# Patient Record
Sex: FEMALE | Race: WHITE | NOT HISPANIC OR LATINO | Employment: OTHER | URBAN - METROPOLITAN AREA
[De-identification: names, ages, dates, MRNs, and addresses within clinical notes are randomized per-mention and may not be internally consistent; named-entity substitution may affect disease eponyms.]

---

## 2020-07-21 ENCOUNTER — OFFICE VISIT (OUTPATIENT)
Dept: NEUROLOGY | Facility: CLINIC | Age: 85
End: 2020-07-21
Payer: MEDICARE

## 2020-07-21 DIAGNOSIS — G31.84 MILD NEUROCOGNITIVE DISORDER: ICD-10-CM

## 2020-07-21 PROBLEM — G47.33 OBSTRUCTIVE SLEEP APNEA SYNDROME: Status: ACTIVE | Noted: 2020-07-21

## 2020-07-21 PROBLEM — M81.0 OSTEOPOROSIS: Status: ACTIVE | Noted: 2020-07-21

## 2020-07-21 PROBLEM — J30.9 ALLERGIC RHINITIS: Status: ACTIVE | Noted: 2020-07-21

## 2020-07-21 PROBLEM — E78.5 HYPERLIPIDEMIA: Status: ACTIVE | Noted: 2020-07-21

## 2020-07-21 PROBLEM — I10 HYPERTENSION: Status: ACTIVE | Noted: 2020-07-21

## 2020-07-21 PROBLEM — E66.9 ADULT-ONSET OBESITY: Status: ACTIVE | Noted: 2020-07-21

## 2020-07-21 PROCEDURE — 96116 PR NEUROBEHAVIORAL STATUS EXAM BY PSYCH/PHYS: ICD-10-PCS | Mod: 95,,, | Performed by: CLINICAL NEUROPSYCHOLOGIST

## 2020-07-21 PROCEDURE — 99499 NO LOS: ICD-10-PCS | Mod: 95,,, | Performed by: CLINICAL NEUROPSYCHOLOGIST

## 2020-07-21 PROCEDURE — 96116 NUBHVL XM PHYS/QHP 1ST HR: CPT | Mod: 95,,, | Performed by: CLINICAL NEUROPSYCHOLOGIST

## 2020-07-21 PROCEDURE — 99499 UNLISTED E&M SERVICE: CPT | Mod: 95,,, | Performed by: CLINICAL NEUROPSYCHOLOGIST

## 2020-07-21 NOTE — Clinical Note
>>Memory Assessment Clinic: Schedule for in-person assessment. She lives near [place], so will take some planning. Also, recommend they get pre-visit labs and pre-visit imaging if possible.   Neuropsych: Testing  Medical/MD/NP: Routine neuro eval  Care Management: Unclear about care needs presently but has good family support

## 2020-07-21 NOTE — PROGRESS NOTES
NEUROPSYCHOLOGY CONSULT (TELEHEALTH)    Referral Information  Name: Chelsi De Los Santos  MRN: [id number]  : 1935  Age: 84 y.o.  Race: White  Gender: female  Referring Provider: Moe Martínez MD  Billing: See below for details as coding/billing has changed   Telemedicine:   The patient location is: Home  The provider location is: [facility]  The chief complaint leading to consultation/medical necessity is: Memory Loss with concern for dementia  Visit type:  Virtual visit with audio only (telephone)    The reason for the audio only service rather than synchronous audio and video virtual visit was related to patient preference.    Total time spent with patient: 60-minutes  Each patient to whom he or she provides medical services by telemedicine is:  (1) informed of the relationship between the physician and patient and the respective role of any other health care provider with respect to management of the patient; and (2) notified that he or she may decline to receive medical services by telemedicine and may withdraw from such care at any time.  Consent/Emergency Plan: The patient expressed an understanding of the purpose of the evaluation and consented to all procedures. I informed the patient of limits to confidentiality and discussed an emergency plan.      SUMMARY/TREATMENT PLAN   Results from the interview indicate the following diagnoses and treatment plan recommendations. This was discussed with patient and sister today    Diagnoses/Plan:  Problem List Items Addressed This Visit        Neuro    Mild neurocognitive disorder    Current Assessment & Plan     Assessment:  -Mild but worsening short-term memory trouble in the past year  -Etiology/Diagnosis:   --primary rule outs include: normal aging, Alzheimer's, vascular etiology, along with reversible causes  -Reversible Risk Factors: No depression, sleep trouble  Plan:  >>[facility] Assessment Clinic: Schedule for in-person assessment. She lives near [place], so will  "take some planning. Also, recommend they get pre-visit labs and pre-visit imaging if possible.   1. Neuropsych: Testing  2. Medical/MD/NP: Routine neuro eval  3. Care Management: Unclear about care needs presently but has good family support                   HISTORY OF PRESENT ILLNESS AND CURRENT SYMPTOMS     Ms. De Los Santos has active problems noted below. She was joined via phone initial consult by her sister.    Cognitive Symptoms:   Onset/Course:   o Per Pt: Some memory trouble but reported "not a big deal." She reported that her family seems more concerned.  o Per Sister: She reported gradual onset in the past year ( "Gradual thing" of memory loss). Course has been mildly worsening over the past year. Sister added "Mainly short-term memory" but "long term is great." Sister denied any other cognitive sxs apart from memory trouble.    Current Functional Status/Needs:  ADLs  Self-Care Eating Safety Other   Independent Independent NA NA     Instrumental IADLs:   Driving Medications/Health Household Finances   -Independent -Sister agreed she takes most days but does forget more now than in the past    -Independent -Sister took over finances 2-years ago due to patient forgetting to pay bills  -This seems the main functional issue at present     Psychiatric/Behavioral Symptoms:  Mood:  Depression/Dysphoria Anxiety/Fearfulness Irritability   -None -None -None     Behavior:  Agitation/Resistance Delusions/Paranoia Hallucinations   -None -None -None     Apathy/Motivation Repetitive/Restlessness Other   -None -None -None     Neurovegetative:  Sleep/Nighttime  Appetite Energy   -No major issues -Normal -Normal     Suicidal/Homicidal Ideation: None     Physical Symptoms: None    PERTINENT BACKGROUND INFORMATION   SOCIAL HISTORY    · Family Status:  11 years ago + Five adult children (daughter and son lives nearby)  · Current Living Situation: Own home  · Primary Source of Support: Adult children and sister  · Daily " "Activities: Still engaged and no major changes  · Stressors: None  · Other Factors:  · Educational Level: HS and attended business school for one year ( school)  · 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   · Other: None    Family Neurologic History: Negative for heritable risk factors  Family Psychiatric History: Negative for heritable risk factors    MEDICAL STATUS  Patient Active Problem List   Diagnosis    Hypertension    Hyperlipidemia    Allergic rhinitis    Osteoporosis    Obstructive sleep apnea syndrome    Adult-onset obesity    Mild neurocognitive disorder     Primary Care Provider: "Used to be Dr. Martínez, but he retired." She sees an NP ("Emily") and Dr. Griffin.     Active problems reported as "nothing beside a little lack of memory."     No past medical history on file.  No past surgical history on file.    Updated/Relevant Neurologic History:  · Falls: None  · TBI: None  · Seizures: None  · Stroke: None  · Movement Concerns: None  · Referral Diagnosis:    Recent Labs and Imaging  No results found for: BIMDMQDA17  No results found for: RPR  No results found for: FOLATE  No results found for: TSH, X5WGGCA, N6YSPJF, THYROIDAB  No results found for: LABA1C, HGBA1C  No results found for: HIV1X2, TQY57RNSN    Current Medications  None in file, but review Care Everywhere     Updated/Relevant Psychiatric History: None    MENTAL STATUS AND OBSERVATIONS:  APPEARANCE: Casually dressed and adequate grooming/hygiene.   ALERTNESS/ORIENTATION: Attentive and alert. Year: 2020, Month: June, Day: Tues, Date: ND, Season: Summer; POTUS: Trump  GAIT: Not assessed  MOTOR MOVEMENTS/MANNERISMS: Not assessed  SPEECH/LANGUAGE: Normal in rate, rhythm, tone, and volume. No significant word finding difficulty noted. Expressive and receptive language was normal.  STATED MOOD/AFFECT: The " "patients stated mood was "good." Affect was congruent with stated mood.   INTERPERSONAL BEHAVIOR: Rapport was quickly and easily established   SUICIDALITY/HOMICIDALITY: Denied  HALLUCINATIONS/DELUSIONS: None evidenced or endorsed  THOUGHT PROCESSES/INSIGHT: Thoughts seemed logical and goal-directed.     QUESTIONNAIRE RESPONSES  Questionnaires were completed via telephone with a psychometrist and reviewed and incorporated by the neuropsychologist.    BILLING  Service Description CPT Code Minutes Units   Psychiatric diagnostic evaluation by physician [id number]     Neurobehavioral status exam by physician [id number] 45 1   Each additional hour by physician [id number]  0   Test Evaluation Services --  --   Neuropsychological testing evaluation services by physician [id number]  0   Each additional hour by physician [id number]  0   Test Administration and Scoring --  --   Psychological or neuropsychological test administration and scoring by physician [id number]  0   Each additional 30 minutes by physician [id number]  0   Psychological or neuropsychological test administration and scoring by technician [id number]  0   Each additional 30 minutes by technician [id number]  0                   "

## 2020-07-31 PROBLEM — G31.84 MILD NEUROCOGNITIVE DISORDER: Status: ACTIVE | Noted: 2020-07-31

## 2020-07-31 NOTE — ASSESSMENT & PLAN NOTE
Assessment:  -Mild but worsening short-term memory trouble in the past year  -Etiology/Diagnosis:   --primary rule outs include: normal aging, Alzheimer's, vascular etiology, along with reversible labs (not apparently ordered but few records right now)  -Reversible Risk Factors: No depression, sleep trouble  Plan:  >>Memory Assessment Clinic: Schedule for in-person assessment. She lives near [place], so will take some planning. Also, recommend they get pre-visit labs and pre-visit imaging if possible.   1. Neuropsych: Testing  2. Medical/MD/NP: Routine neuro eval  3. Care Management: Unclear about care needs presently but has good family support

## 2020-08-25 ENCOUNTER — TELEPHONE (OUTPATIENT)
Dept: NEUROLOGY | Facility: CLINIC | Age: 85
End: 2020-08-25

## 2020-08-26 ENCOUNTER — INITIAL CONSULT (OUTPATIENT)
Dept: NEUROLOGY | Facility: CLINIC | Age: 85
End: 2020-08-26
Payer: MEDICARE

## 2020-08-26 ENCOUNTER — TELEPHONE (OUTPATIENT)
Dept: NEUROLOGY | Facility: CLINIC | Age: 85
End: 2020-08-26

## 2020-08-26 DIAGNOSIS — G30.9 MAJOR NEUROCOGNITIVE DISORDER DUE TO ALZHEIMER'S DISEASE: ICD-10-CM

## 2020-08-26 DIAGNOSIS — F02.80 MAJOR NEUROCOGNITIVE DISORDER DUE TO ALZHEIMER'S DISEASE: ICD-10-CM

## 2020-08-26 PROCEDURE — 99999 PR PBB SHADOW E&M-EST. PATIENT-LVL I: ICD-10-PCS | Mod: PBBFAC,,,

## 2020-08-26 PROCEDURE — 96138 PR PSYCH/NEUROPSYCH TEST ADMIN/SCORING, BY TECH, 2+ TESTS, 1ST 30 MIN: ICD-10-PCS | Mod: ,,, | Performed by: CLINICAL NEUROPSYCHOLOGIST

## 2020-08-26 PROCEDURE — 96116 PR NEUROBEHAVIORAL STATUS EXAM BY PSYCH/PHYS: ICD-10-PCS | Mod: S$PBB,,, | Performed by: CLINICAL NEUROPSYCHOLOGIST

## 2020-08-26 PROCEDURE — 99499 UNLISTED E&M SERVICE: CPT | Mod: S$PBB,,, | Performed by: PSYCHIATRY & NEUROLOGY

## 2020-08-26 PROCEDURE — 96132 NRPSYC TST EVAL PHYS/QHP 1ST: CPT | Mod: ,,, | Performed by: CLINICAL NEUROPSYCHOLOGIST

## 2020-08-26 PROCEDURE — 96116 NUBHVL XM PHYS/QHP 1ST HR: CPT | Mod: S$PBB,,, | Performed by: CLINICAL NEUROPSYCHOLOGIST

## 2020-08-26 PROCEDURE — 96139 PR PSYCH/NEUROPSYCH TEST ADMIN/SCORING, BY TECH, 2+ TESTS, EA ADDTL 30 MIN: ICD-10-PCS | Mod: ,,, | Performed by: CLINICAL NEUROPSYCHOLOGIST

## 2020-08-26 PROCEDURE — 99499 NO LOS: ICD-10-PCS | Mod: S$PBB,,, | Performed by: PSYCHIATRY & NEUROLOGY

## 2020-08-26 PROCEDURE — 96139 PSYCL/NRPSYC TST TECH EA: CPT | Mod: ,,, | Performed by: CLINICAL NEUROPSYCHOLOGIST

## 2020-08-26 PROCEDURE — 96133 NRPSYC TST EVAL PHYS/QHP EA: CPT | Mod: ,,, | Performed by: CLINICAL NEUROPSYCHOLOGIST

## 2020-08-26 PROCEDURE — 99211 OFF/OP EST MAY X REQ PHY/QHP: CPT | Mod: PBBFAC,25

## 2020-08-26 PROCEDURE — 96133 PR NEUROPSYCHOLOGIC TEST EVAL SVCS, EA ADDTL HR: ICD-10-PCS | Mod: ,,, | Performed by: CLINICAL NEUROPSYCHOLOGIST

## 2020-08-26 PROCEDURE — 99205 PR OFFICE/OUTPT VISIT, NEW, LEVL V, 60-74 MIN: ICD-10-PCS | Mod: S$PBB,,, | Performed by: NURSE PRACTITIONER

## 2020-08-26 PROCEDURE — 96116 NUBHVL XM PHYS/QHP 1ST HR: CPT | Mod: PBBFAC | Performed by: CLINICAL NEUROPSYCHOLOGIST

## 2020-08-26 PROCEDURE — 96132 PR NEUROPSYCHOLOGIC TEST EVAL SVCS, 1ST HR: ICD-10-PCS | Mod: ,,, | Performed by: CLINICAL NEUROPSYCHOLOGIST

## 2020-08-26 PROCEDURE — 99999 PR PBB SHADOW E&M-EST. PATIENT-LVL I: CPT | Mod: PBBFAC,,,

## 2020-08-26 PROCEDURE — 99205 OFFICE O/P NEW HI 60 MIN: CPT | Mod: S$PBB,,, | Performed by: NURSE PRACTITIONER

## 2020-08-26 PROCEDURE — 96138 PSYCL/NRPSYC TECH 1ST: CPT | Mod: ,,, | Performed by: CLINICAL NEUROPSYCHOLOGIST

## 2020-08-26 RX ORDER — BIMATOPROST 0.3 MG/ML
1 SOLUTION/ DROPS OPHTHALMIC NIGHTLY
COMMUNITY

## 2020-08-26 RX ORDER — DORZOLAMIDE HYDROCHLORIDE AND TIMOLOL MALEATE 20; 5 MG/ML; MG/ML
1 SOLUTION/ DROPS OPHTHALMIC 2 TIMES DAILY
COMMUNITY

## 2020-08-26 RX ORDER — SIMVASTATIN 40 MG/1
40 TABLET, FILM COATED ORAL NIGHTLY
COMMUNITY

## 2020-08-26 RX ORDER — METOPROLOL SUCCINATE 100 MG/1
150 TABLET, EXTENDED RELEASE ORAL NIGHTLY
COMMUNITY

## 2020-08-26 RX ORDER — CHOLECALCIFEROL (VITAMIN D3) 25 MCG
5000 TABLET ORAL
COMMUNITY

## 2020-08-26 RX ORDER — AMOXICILLIN 500 MG
1 CAPSULE ORAL DAILY
COMMUNITY

## 2020-08-26 RX ORDER — UBIDECARENONE 30 MG
100 CAPSULE ORAL 2 TIMES DAILY
COMMUNITY

## 2020-08-26 RX ORDER — IBUPROFEN 200 MG
1 CAPSULE ORAL DAILY
COMMUNITY

## 2020-08-26 RX ORDER — RAMIPRIL 10 MG/1
20 CAPSULE ORAL DAILY
COMMUNITY

## 2020-08-26 RX ORDER — CETIRIZINE HYDROCHLORIDE 10 MG/1
10 TABLET ORAL DAILY
COMMUNITY

## 2020-08-26 RX ORDER — PROPAFENONE HYDROCHLORIDE 150 MG/1
150 TABLET, COATED ORAL EVERY 8 HOURS
COMMUNITY

## 2020-08-26 RX ORDER — ASPIRIN 81 MG/1
81 TABLET ORAL DAILY
COMMUNITY

## 2020-08-26 NOTE — PROGRESS NOTES
"Name: Chelsi De Los Santos  MRN: [id number]   CSN: [id number]      Date: 8-      Referring physician:  MICHAEL Thakkar II, PhD  [address] [place] [address]    Subjective:      Chief Complaint: "daughter set this up"    History of Present Illness (HPI):    Chelsi De Los Santos is a 84 y.o. right-handed female with HTN, HLD, MULU who presents today for an initial evaluation in the [facility] for memory loss and is accompanied by daughters. She had an audio visit with Dr. Thakkar 7-.     Ms. De Los Santos is interviewed alone and provides the following:  Sharri lives here and she has been forgetful and you name it and I have gone to it and Mary Beth works at [employer]. She talked to this man and here I am. Do you know Mary Beth?    Lately, she has noticed she is little forgetful but is almost 85 years old.     When asked about MULU and CPAP, she offers that this is "biggest racket there ever was." It borke and got new one. Everyone ends up with CPAP. Snores sometimes if allergies.She offers that her next appt is Monday and they will probably order her a new on. She says she will use it because that is what they will tell her to do and she is 85.         Review of Systems   Constitutional: Negative for appetite change.   HENT: Positive for hearing loss (wear hearing aids). Negative for trouble swallowing.    Eyes: Negative for visual disturbance.   Respiratory: Negative.    Cardiovascular: Negative.    Gastrointestinal: Negative for constipation, diarrhea, nausea and vomiting.   Genitourinary: Negative.    Musculoskeletal: Negative for gait problem.        Arthritis in hands only, nothing hurts   Skin:        Bruise easy   Neurological: Negative for dizziness, tremors, light-headedness and headaches.   Psychiatric/Behavioral: Negative for dysphoric mood, hallucinations and sleep disturbance. The patient is nervous/anxious (sometimes).        Past Medical History: The patient  has no past " medical history on file.    Social History: The patient  reports that she has never smoked. She has never used smokeless tobacco. She reports current alcohol use.    Family History: Their family history is not on file.    Allergies: Sulfa (sulfonamide antibiotics)     Meds:   Current Outpatient Medications on File Prior to Visit   Medication Sig Dispense Refill    aspirin (ECOTRIN) 81 MG EC tablet Take 81 mg by mouth once daily.      bimatoprost (LUMIGAN) 0.03 % ophthalmic drops 1 drop every evening.      calcium citrate (CALCITRATE) 200 mg (950 mg) tablet Take 1 tablet by mouth once daily.      cetirizine (ZYRTEC) 10 MG tablet Take 10 mg by mouth once daily.      co-enzyme Q-10 30 mg capsule Take 100 mg by mouth 2 (two) times a day.      dorzolamide-timolol 2-0.5% (COSOPT) 22.3-6.8 mg/mL ophthalmic solution 1 drop 2 (two) times daily.      metoprolol succinate (TOPROL-XL) 100 MG 24 hr tablet Take 150 mg by mouth every evening.      omega-3 fatty acids/fish oil (FISH OIL-OMEGA-3 FATTY ACIDS) 300-1,000 mg capsule Take 1 capsule by mouth once daily.      propafenone (RHTHYMOL) 150 MG Tab Take 150 mg by mouth every 8 (eight) hours.      ramipriL (ALTACE) 10 MG capsule Take 20 mg by mouth once daily.      simvastatin (ZOCOR) 40 MG tablet Take 40 mg by mouth every evening.      vitamin D (VITAMIN D3) 1000 units Tab Take 5,000 Units by mouth 3 (three) times a week.       No current facility-administered medications on file prior to visit.        Objective:     Physical Exam:    Constitutional  Well-developed, well-nourished, appears stated age  Very pleasant, appropriately dressed and groomed     ..  Neurological    * Mental status     - Orientation Oriented to: person and situation  Not otherwise tested as will have cognitive testing today     - Memory     Not Tested     - Attention/concentration  Easily distracted, difficult to re-direct at times     - Language  spontaneous, fluent, loquacious, tangential      - Executive  Impaired     ..  * Cranial nerves       - CN II  PERRL, visual fields full to confrontation     - CN III, IV, VI  Extraocular movements full, normal pursuits and saccades     - CN VII  Face strong and symmetric bilaterally     - CN VIII  Hearing intact bilaterally     - CN XI  SCM and trapezius 5/5 bilaterally   * Motor  Muscle bulk normal, strength 5/5 throughout   * Coordination  No dysmetria with finger-to-nose        ..  * Specialized movement exam  No hypophonic speech.    No facial masking.   No cogwheel rigidity.     No bradykinesia.   No tremor with rest, posture, kinesis, or intention.    No other dystonia, chorea, athetosis, myoclonus, or tics.  Pushes self to stand.    No motor impersistence.   Normal-based gait.   Shortened stride length.  Somewhat stopped posture.   Looks bit antalgic.    No abnormal arm swing.                  Laboratory Results:  No visits with results within 3 Month(s) from this visit.   Latest known visit with results is:   No results found for any previous visit.       Imaging:   Independent review of images: None to view  Spoke to daughters who said the report was emailed to Daisy.   It is not scanned in her chart.   Says this was done by her outside neurologist.         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   I am not sure if that is who referred her for testing or not. She met with Dr. Thakkar in July and he referred to this clinic.     I am happy to review the MRI brain report and images if they like.   I have also let Dr. Thakkar that this has apparently been done.     I have also discussed reversible memory labs with them.  Dtr notes that she has had a lot of labs done recently and can get those to us.     I do not see any evidence of parkinsonism.     I am happy to help if needed.     I spent total of 40 minutes face-to-face with the  patient and daughters with >50% of the time spent with counseling and education regarding:  - results of data, diagnosis, and recommendations stated above  - rationale of brain scan and labs      Francie Iglesias DNP, NP-C  Division of Movement and Memory Disorders  [facility]  [phone]

## 2020-08-26 NOTE — PROGRESS NOTES
INTERDISCIPLINARY MEMORY ASSESSMENT CLINIC  Neuropsychology Visit    Referral Information  Name: Chelsi De Los Santos  MRN: [id number]  : 1935  Age: 84 y.o.  Race: White  Gender: female  Referring Provider: Moe Martínez MD  Billing: See below for details as coding/billing has changed   The chief complaint leading to consultation/medical necessity is: Memory Loss with concern for dementia  Visit type:  Testing, Report, and Prelim Feedback with Initial Consult on 20    SUMMARY/TREATMENT PLAN   Results from interdisciplinary Memory Clinic Evaluation (neuropsych testing, clinical interviews, neurology exam, review of records) indicate the following diagnoses and treatment plan recommendations. This will be discussed with the patient/family at feedback. Preliminarily, results were discussed with daughters today.    Diagnoses/Plan:  Problem List Items Addressed This Visit        Other    Major neurocognitive disorder due to Alzheimer's disease    Current Assessment & Plan     Assessment:  -Cognitive Testing: MoCA=15/30 showing significant cognitive impairment. More detailed neuropsych testing showed significant and circumscribed impairment for memory with no benefit from cueing/structure. Additionally, mild executive dysfunction was also noted. Otherwise, her cognitive scores were normal or better than normal for age.   -Etiology/Diagnosis/:   >>Very likely late onset Alzheimer's dementia is primary etiology based on testing, onset/course of sxs, presentation today, medical records, and absence of known reversible causes.'  Stage:  >>Stage is mild at this time. Functionally, she needs help with medications and finances. But, can otherwise manage her home, health, and daily needs.   -Dementia Concerns:  >>No behavioral or safety issues currently.   Plan:  >>Memory Continuity Clinic: After feedback discussion (education/tx plan discussion) with patient/family, follow-up in 12-months for:  1. Neuropsych:  Testing in 12-months  2. Medical/MD/NP: Neuro exam but no interim issues as she has a home neurologist  3. Care Management: None currently      >>Primary Care: Follow-up per usual      >>Driving Evaluation: Based on these findings we recommend completely discontinue driving until undergoing a formal, on-the-road driving evaluation. This evaluation can be completed at the [facility] with Chon Kale. If interested, I can place a referral and the family can contact Elva Ramirez at [phone] for scheduling.    >>Supervision/Monitoring: Needs more daily monitoring of meds and will discuss with family    >>Practice good cognitive/brain health hygiene:   1. Engage in regular exercise, which increases alertness and arousal and can improve attention and focus.   2. Develop a consistent daily/weekly routine,  3. Eat healthy foods and balanced meals. Talk with your physician or nutritionist about whats right for you, but a Mediterranean diet has been found to be most effective at promoting brain health.  4. Keep your brain active. Find activities appropriate to stay mentally active.  5. Stay socially engaged. Continue staying active with your family and friends.    >>Prepare for the future: Caregivers should consider formal arrangements to allow a designated person to make medical and financial decisions should she become unable to do so.  Options to consider include designating a healthcare proxy, medical and/or financial power of , and completing advanced directives for healthcare decisions and estate planning (e.g., finalizing a will).  If cost is prohibitive, [facility] Legal Services ([email or web address]/) provides free  for individuals with low income.                       MICHAEL Thakkar II, Ph.D., ABPP-CN  Board Certified Clinical Neuropsychologist  Co-Director, Cognitive Disorders and Brain Health Program  Department of Neurology and Neurosciences  [facility]  "System      HISTORY OF PRESENT ILLNESS AND CURRENT SYMPTOMS     Ms. De Los Santos has active problems noted below. She was joined for the in-person eval with her daughters after the initial consult via VV in July 2020.    Cognitive Symptoms:   Onset/Course:   o Per Pt: Some memory trouble but reported "not a big deal." She reported that her family seems more concerned.  o Per Sister (Reported at our initial consult in July): She reported gradual onset in the past year ( "Gradual thing" of memory loss). Course has been mildly worsening over the past year. Sister added "Mainly short-term memory" but "long term is great." Sister denied any other cognitive sxs apart from memory trouble.  o Per Daughters (Reported on 08/26/20 Visit): Onset of short term memory trouble was 3-years ago with progressive course. Daughters have noted a more accelerated decline in the past year.   - Daughters have noted that their mother is "extremely" forgetful now, repeats herself much more frequently, and they are concerned about her ability to live independently at some point in the future "if her memory keeps declining like this."    Current Functional Status/Needs:  ADLs  Self-Care Eating Safety Other   Independent Independent NA NA     Instrumental IADLs:   Driving Medications/Health Household Finances   -Independent   -Sister agreed she takes most days but forgets more now than in the past  -Daughters note that she cannot set up her medications now  -Independent -Sister took over finances 2-years ago due to patient forgetting to pay bills       Psychiatric/Behavioral Symptoms:  Mood:  Depression/Dysphoria Anxiety/Fearfulness Irritability   -None -None per patient  -anxiety when around siblings that is longstanding and possibly worse in the past year -None     Behavior:  Agitation/Resistance Delusions/Paranoia Hallucinations   -None -None -None     Apathy/Motivation Repetitive/Restlessness Other   -None -None -None " "    Neurovegetative:  Sleep/Nighttime  Appetite Energy   -No major issues -Normal -Normal     Suicidal/Homicidal Ideation: None     Physical Symptoms: None    PERTINENT BACKGROUND INFORMATION   SOCIAL HISTORY    · Family Status:  11 years ago + Five adult children (daughter and son lives nearby)  · Current Living Situation: Own home  · Primary Source of Support: Adult children and sister  · Daily Activities: Still engaged and no major changes  · Stressors: None  · Other Factors:  · Educational Level: HS and attended business school for one year ( school)  · 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   · Other: None    Family Neurologic History: Negative for heritable risk factors  Family Psychiatric History: Negative for heritable risk factors    MEDICAL STATUS  Patient Active Problem List   Diagnosis    Hypertension    Hyperlipidemia    Allergic rhinitis    Osteoporosis    Obstructive sleep apnea syndrome    Adult-onset obesity    Major neurocognitive disorder due to Alzheimer's disease     Primary Care Provider: "Used to be Dr. Martínez, but he retired." She sees an NP ("Emily") and Dr. Griffin.     Active problems reported as "nothing beside a little lack of memory."     History reviewed. No pertinent past medical history.  History reviewed. No pertinent surgical history.    Updated/Relevant Neurologic History:  · Falls: None  · TBI: None  · Seizures: None  · Stroke: None  · Movement Concerns: None    Recent Labs  Review Scanned Media tab, but unremarkable for Folate, B12, TSH  No results found for: NDQOULAT19  No results found for: RPR  No results found for: FOLATE  No results found for: TSH, E1JOCMG, O2YYKZD, THYROIDAB  No results found for: LABA1C, HGBA1C  No results found for: HIV1X2, TUG27WNAN    Recent Imaging  -2020 (Copied from report)  IMPRESSION:  1. Changes in " "the cerebral white matter which may reflect small vessel  disease. Atrophy. No MRI evidence of acute cerebrovascular insult.    Current Medications  None in file, but review Care Everywhere     Updated/Relevant Psychiatric History: None    MENTAL STATUS AND OBSERVATIONS:  APPEARANCE: Casually dressed and adequate grooming/hygiene.   ALERTNESS/ORIENTATION: Attentive and alert. Year: 2020, Month: June, Day: Tues, Date: ND, Season: Summer; POTUS: Trump  GAIT: See neuro note  MOTOR MOVEMENTS/MANNERISMS: See neuro note  SPEECH/LANGUAGE: Normal in rate, rhythm, tone, and volume. No significant word finding difficulty noted. Expressive and receptive language was normal.  STATED MOOD/AFFECT: The patients stated mood was "good." Affect was congruent with stated mood.   INTERPERSONAL BEHAVIOR: Rapport was quickly and easily established   SUICIDALITY/HOMICIDALITY: Denied  HALLUCINATIONS/DELUSIONS: None evidenced or endorsed  THOUGHT PROCESSES/INSIGHT: Thoughts seemed logical and goal-directed.     COGNITIVE SCREENING Raw Score Type of Standardized Score Standardized Score   MoCA 15/30 - -   Orientation - Place 2/2 - -   Orientation - Date 2/4 - -   RBANS      Immediate Memory - SS 57   VS/Construction -    Language -    Attention -    Delayed Memory - SS 44   Total Scale - SS 80   Subtests      List Learning 14 ss 3   Story Memory 6 ss 3   Figure Copy 18 ss 11   Line Orientation 15 ss -   Naming 10 ss -   Fluency 17 ss 10   Digit Span 10 ss 12   Coding 44 ss 15   List Recall 0 ss -   List Recognition 12 ss -   Story Recall 3 ss 5   Figure Recall 0 ss 1   LANGUAGE FUNCTIONING Raw Score Type of Standardized Score Standardized Score   FAS 27 Tscore 39   Animal Naming 11 Tscore 34   EXECUTIVE FUNCTIONING Raw Score Type of Standardized Score Standardized Score   MIGUELANGEL 13/18       MoCA 8/26/2020   Alternating [place] Making 1 - correct   Cube 1 - correct   Clock Contour 1 - correct   Clock Numbers 1 - correct   Clock " Hands 0 - incorrect   Lion 1 - correct   Rhino 1 - correct   Camel 0 - incorrect   Face Yes   Velvet Yes   Lutheran No   Madeleine No   Red Yes   Face No   Velvet No   Lutheran Yes   Madeleine Yes   Red Yes   Forward Order - 2 1 8 5 4 0 - incorrect   Attention - Backward Order 1 - correct   Letters 1 - correct   Numbers 2 - 2 or 3 correct   Repeat - Brandon 0 - incorrect   Repeat - Cat 0 - incorrect   Fluency  0 - incorrect   Train - Bicycle 0 - incorrect   Watch-Ruler 1 - correct   Face 0 - incorrect   Velvet 0 - incorrect   Lutheran 0 - incorrect   Madeleine 0 - incorrect   Red 0 - incorrect   Date 0 - incorrect   Month 1 - correct   Year 1 - correct   Day 0 - incorrect   Place 1 - correct   City 1 - correct   Add 1 point if less than or equal to 12 yr edu 0 - incorrect   Total Score 15         BILLING  Service Description CPT Code Minutes Units   Psychiatric diagnostic evaluation by physician [id number]     Neurobehavioral status exam by physician [id number] [phone] 1   Each additional hour by physician [id number]  0   Test Evaluation Services --  --   Neuropsychological testing evaluation services by physician [phone] 60 1   Each additional hour by physician [phone] 1   Test Administration and Scoring --  --   Psychological or neuropsychological test administration and scoring by physician [id number]  0   Each additional 30 minutes by physician [id number]  0   Psychological or neuropsychological test administration and scoring by technician [id number] [phone] 1   Each additional 30 minutes by technician [phone]

## 2020-08-28 PROBLEM — F02.80 MAJOR NEUROCOGNITIVE DISORDER DUE TO ALZHEIMER'S DISEASE: Status: ACTIVE | Noted: 2020-07-31

## 2020-08-28 PROBLEM — G30.9 MAJOR NEUROCOGNITIVE DISORDER DUE TO ALZHEIMER'S DISEASE: Status: ACTIVE | Noted: 2020-07-31

## 2020-08-28 NOTE — ASSESSMENT & PLAN NOTE
Assessment:  -Cognitive Testing: MoCA=15/30 showing significant cognitive impairment. More detailed neuropsych testing showed significant and circumscribed impairment for memory with no benefit from cueing/structure. Additionally, mild executive dysfunction was also noted. Otherwise, her cognitive scores were normal or better than normal for age.   -Etiology/Diagnosis/:   >>Very likely late onset Alzheimer's dementia is primary etiology based on testing, onset/course of sxs, presentation today, medical records, and absence of known reversible causes.'  Stage:  >>Stage is mild at this time. Functionally, she needs help with medications and finances. But, can otherwise manage her home, health, and daily needs.   -Dementia Concerns:  >>No behavioral or safety issues currently.   Plan:  >>Memory Continuity Clinic: After feedback discussion (education/tx plan discussion) with patient/family, follow-up in 12-months for:  1. Neuropsych: Testing in 12-months  2. Medical/MD/NP: Neuro exam but no interim issues as she has a home neurologist  3. Care Management: None currently      >>Primary Care: Follow-up per usual      >>Driving Evaluation: Based on these findings we recommend completely discontinue driving until undergoing a formal, on-the-road driving evaluation. This evaluation [facility] be completed at the [facility] with Chon Henley. If interested, I can place a referral and the family can contact Elva Ramirez at [phone] for scheduling.    >>Supervision/Monitoring: Needs more daily monitoring of meds and will discuss with family    >>Practice good cognitive/brain health hygiene:   1. Engage in regular exercise, which increases alertness and arousal and can improve attention and focus.   2. Develop a consistent daily/weekly routine,  3. Eat healthy foods and balanced meals. Talk with your physician or nutritionist about whats right for you, but a Mediterranean diet has been found to be most effective at  promoting brain health.  4. Keep your brain active. Find activities appropriate to stay mentally active.  5. Stay socially engaged. Continue staying active with your family and friends.    >>Prepare for the future: Caregivers should consider formal arrangements to allow a designated person to make medical and financial decisions should she become unable to do so.  Options to consider include designating a healthcare proxy, medical and/or financial power of , and completing advanced directives for healthcare decisions and estate planning (e.g., finalizing a will).  If cost is prohibitive, [facility] [employer] Services (https://ls.[email or web address]/) provides free  for individuals with low income.

## 2020-09-18 ENCOUNTER — PATIENT MESSAGE (OUTPATIENT)
Dept: NEUROLOGY | Facility: CLINIC | Age: 85
End: 2020-09-18

## 2020-09-22 ENCOUNTER — TELEPHONE (OUTPATIENT)
Dept: NEUROLOGY | Facility: CLINIC | Age: 85
End: 2020-09-22

## 2020-09-22 NOTE — PROGRESS NOTES
INTERDISCIPLINARY MEMORY ASSESSMENT CLINIC  Neuropsychology Visit - Feedback Session    Referral Information  Name: Chelsi De Los Santos  MRN: [id number]  : 1935  Age: 84 y.o.  Billing: Charges for Neuropsychology Feedback billed on 2020    The chief complaint leading to consultation/medical necessity is: Feedback session for results/treatment plan discussion  Total time spent with patient: 70-minutes  Each patient to whom he or she provides medical services by telemedicine is:  (1) informed of the relationship between the physician and patient and the respective role of any other health care provider with respect to management of the patient; and (2) notified that he or she may decline to receive medical services by telemedicine and may withdraw from such care at any time.  Consent/Emergency Plan: The patient expressed an understanding of the purpose of the evaluation and consented to all procedures. I informed the patient of limits to confidentiality and discussed an emergency plan.    Note: Review Neuropsychology Consult dated for 2020 details of feedback discussion. No further neuropsychology feedback needed.

## 2020-09-22 NOTE — TELEPHONE ENCOUNTER
Spoke with daughter for 15 min about upcoming dementia clinic appointment and discussed daughter's concern about using certain terms with her mother.  We discussed how the feedback would go and daughter was feeling much more comfortable at the end.

## 2020-09-23 ENCOUNTER — OFFICE VISIT (OUTPATIENT)
Dept: NEUROLOGY | Facility: CLINIC | Age: 85
End: 2020-09-23
Payer: MEDICARE

## 2020-09-23 DIAGNOSIS — F02.80 MAJOR NEUROCOGNITIVE DISORDER DUE TO ALZHEIMER'S DISEASE: Primary | ICD-10-CM

## 2020-09-23 DIAGNOSIS — G30.9 MAJOR NEUROCOGNITIVE DISORDER DUE TO ALZHEIMER'S DISEASE: Primary | ICD-10-CM

## 2020-09-23 PROCEDURE — 99499 NO LOS: ICD-10-PCS | Mod: S$PBB,,, | Performed by: CLINICAL NEUROPSYCHOLOGIST

## 2020-09-23 PROCEDURE — 99999 PR PBB SHADOW E&M-EST. PATIENT-LVL II: ICD-10-PCS | Mod: PBBFAC,,,

## 2020-09-23 PROCEDURE — 99999 PR PBB SHADOW E&M-EST. PATIENT-LVL II: CPT | Mod: PBBFAC,,,

## 2020-09-23 PROCEDURE — 99499 UNLISTED E&M SERVICE: CPT | Mod: S$PBB,,, | Performed by: CLINICAL NEUROPSYCHOLOGIST

## 2020-09-23 PROCEDURE — 99212 OFFICE O/P EST SF 10 MIN: CPT | Mod: PBBFAC

## 2020-09-23 NOTE — PATIENT INSTRUCTIONS
>>Memory Continuity Clinic: After feedback discussion (education/tx plan discussion) with patient/family, follow-up in 12-months for:  1. Neuropsych: Testing in 12-months  2. Medical/MD/NP: Neuro exam but no interim issues as she has a home neurologist  3. Care Management: None currently       >>Primary Care: Follow-up per usual        >>Driving Evaluation: Based on these findings we recommend completely discontinue driving until undergoing a formal, on-the-road driving evaluation. This evaluation [facility] be completed at the [facility] with Chon Henley. If interested, I can place a referral and the family can contact Elva Washington at [phone] for scheduling.     >>Supervision/Monitoring: Needs more daily monitoring of meds and will discuss with family     >>Practice good cognitive/brain health hygiene:   1. Engage in regular exercise, which increases alertness and arousal and can improve attention and focus.   2. Develop a consistent daily/weekly routine,  3. Eat healthy foods and balanced meals. Talk with your physician or nutritionist about whats right for you, but a Mediterranean diet has been found to be most effective at promoting brain health.  4. Keep your brain active. Find activities appropriate to stay mentally active.  5. Stay socially engaged. Continue staying active with your family and friends.     >>Prepare for the future: Caregivers should consider formal arrangements to allow a designated person to make medical and financial decisions should she become unable to do so.  Options to consider include designating a healthcare proxy, medical and/or financial power of , and completing advanced directives for healthcare decisions and estate planning (e.g., finalizing a will).  If cost is prohibitive, [facility] [employer] Services (https://ls.[email or web address]/) provides free  for individuals with low income.

## 2021-09-15 ENCOUNTER — TELEPHONE (OUTPATIENT)
Dept: NEUROLOGY | Facility: CLINIC | Age: 86
End: 2021-09-15